# Patient Record
Sex: FEMALE | Race: BLACK OR AFRICAN AMERICAN | URBAN - METROPOLITAN AREA
[De-identification: names, ages, dates, MRNs, and addresses within clinical notes are randomized per-mention and may not be internally consistent; named-entity substitution may affect disease eponyms.]

---

## 2017-07-16 DIAGNOSIS — I10 ESSENTIAL HYPERTENSION: ICD-10-CM

## 2017-07-19 RX ORDER — LOSARTAN POTASSIUM AND HYDROCHLOROTHIAZIDE 12.5; 5 MG/1; MG/1
1 TABLET ORAL DAILY
Qty: 30 TABLET | Refills: 0 | OUTPATIENT
Start: 2017-07-19

## 2017-07-19 NOTE — TELEPHONE ENCOUNTER
Requesting Losartan-HCTZ refill    Contacted pt as noted last OV 2/23/17; appt scheduled for 8/21/17. Pt states doctor already phoned in this prescription and she had enough med to last until scheduled appt.      Hypertensive Medications  Protocol Criteria:

## 2019-06-13 DIAGNOSIS — I10 ESSENTIAL HYPERTENSION: ICD-10-CM

## 2019-06-14 RX ORDER — LOSARTAN POTASSIUM AND HYDROCHLOROTHIAZIDE 25; 100 MG/1; MG/1
1 TABLET ORAL DAILY
Qty: 90 TABLET | Refills: 1 | Status: SHIPPED | OUTPATIENT
Start: 2019-06-14 | End: 2019-06-20 | Stop reason: RX

## 2019-06-14 NOTE — TELEPHONE ENCOUNTER
Please review; protocol failed.     GQD-[birth year]  LR-1/5/16    Requested Prescriptions   Pending Prescriptions Disp Refills   • LOSARTAN POTASSIUM-HCTZ 100-25 MG Oral Tab [Pharmacy Med Name: LOSARTAN/HCTZ 100/25MG TABLETS] 90 tablet 0     Sig: TAKE 1 TABLET

## 2019-06-19 ENCOUNTER — TELEPHONE (OUTPATIENT)
Dept: OTHER | Age: 59
End: 2019-06-19

## 2019-06-19 RX ORDER — HYDROCHLOROTHIAZIDE 25 MG/1
25 TABLET ORAL DAILY
Qty: 90 TABLET | Refills: 1 | Status: SHIPPED | OUTPATIENT
Start: 2019-06-19 | End: 2020-02-20

## 2019-06-19 RX ORDER — LOSARTAN POTASSIUM 100 MG/1
100 TABLET ORAL DAILY
Qty: 90 TABLET | Refills: 0 | Status: SHIPPED | OUTPATIENT
Start: 2019-06-19 | End: 2019-08-20

## 2019-06-19 NOTE — TELEPHONE ENCOUNTER
Per Adele Grover at [facility] 100-25 is on back order and would like to know if the medication can be split? Per pharmacy pt picked up the medication separate before.

## 2019-06-20 NOTE — TELEPHONE ENCOUNTER
Spoke with patient ,advised that Dr Frederick Dimas sent 2 new prescriptions in replaced of losartan hydrochlorothiazide due to back order, verbalized understanding.         Note      Prescription refilled; call patient.               Note      It is okay to split

## 2019-07-08 ENCOUNTER — TELEPHONE (OUTPATIENT)
Dept: FAMILY MEDICINE CLINIC | Facility: CLINIC | Age: 59
End: 2019-07-08

## 2019-07-08 NOTE — TELEPHONE ENCOUNTER
Called [facility], [place], on hold then phone call was answered and put on hold again, then it rang and was picked up and put on hold again. Advised pharmacist of scripts sent to [facility]; pharmacist saw them.     [address]

## 2019-08-20 RX ORDER — LOSARTAN POTASSIUM 100 MG/1
TABLET ORAL
Qty: 90 TABLET | Refills: 0 | Status: SHIPPED | OUTPATIENT
Start: 2019-08-20

## 2019-08-20 NOTE — TELEPHONE ENCOUNTER
Hypertensive Medications  Protocol Criteria:  · Appointment scheduled in the past 6 months or in the next 3 months  · BMP or CMP in the past 12 months  · Creatinine result < 2  Recent Outpatient Visits            3 years ago Pre-operative laboratory examin

## 2019-08-20 NOTE — TELEPHONE ENCOUNTER
CSS please contact pt to schedule OV. LOV 2/23/16    Please review; protocol failed.   No recent labs, no recent OV

## 2019-11-21 DIAGNOSIS — Z78.9 ENGAGES IN TRAVEL ABROAD: Primary | ICD-10-CM

## 2019-11-21 RX ORDER — CHOLERA VACCINE, LIVE, ORAL 0.4B TO 2B
100 KIT ORAL ONCE
Qty: 100 ML | Refills: 0 | Status: SHIPPED | OUTPATIENT
Start: 2019-11-21 | End: 2019-11-21

## 2019-11-21 RX ORDER — MEFLOQUINE HYDROCHLORIDE 250 MG/1
250 TABLET ORAL
Qty: 8 TABLET | Refills: 0 | Status: SHIPPED | OUTPATIENT
Start: 2019-11-21

## 2019-11-21 RX ORDER — MEFLOQUINE HYDROCHLORIDE 250 MG/1
250 TABLET ORAL
Qty: 8 TABLET | Refills: 0 | Status: SHIPPED | OUTPATIENT
Start: 2019-11-21 | End: 2019-11-21

## 2019-12-01 DIAGNOSIS — K21.9 GASTROESOPHAGEAL REFLUX DISEASE, ESOPHAGITIS PRESENCE NOT SPECIFIED: Primary | ICD-10-CM

## 2019-12-01 RX ORDER — DOXYCYCLINE HYCLATE 100 MG/1
100 CAPSULE ORAL 2 TIMES DAILY
Qty: 20 CAPSULE | Refills: 0 | Status: SHIPPED | OUTPATIENT
Start: 2019-12-01 | End: 2020-04-03

## 2019-12-01 RX ORDER — NICOTINE POLACRILEX 4 MG/1
20 GUM, CHEWING ORAL DAILY
Qty: 30 TABLET | Refills: 1 | Status: SHIPPED | OUTPATIENT
Start: 2019-12-01 | End: 2019-12-31

## 2020-02-20 NOTE — TELEPHONE ENCOUNTER
To reception staff, pls call pt for appt. Review pended refill request as it does not fall under a protocol.   Requested Prescriptions     Pending Prescriptions Disp Refills   • HYDROCHLOROTHIAZIDE 25 MG Oral Tab [Pharmacy Med Name: Diana Griffin

## 2020-02-21 RX ORDER — HYDROCHLOROTHIAZIDE 25 MG/1
TABLET ORAL
Qty: 90 TABLET | Refills: 0 | Status: SHIPPED | OUTPATIENT
Start: 2020-02-21

## 2020-04-03 DIAGNOSIS — K21.9 GASTROESOPHAGEAL REFLUX DISEASE, ESOPHAGITIS PRESENCE NOT SPECIFIED: Primary | ICD-10-CM

## 2020-04-03 RX ORDER — OMEPRAZOLE 20 MG/1
20 CAPSULE, DELAYED RELEASE ORAL
Qty: 90 CAPSULE | Refills: 1 | Status: SHIPPED | OUTPATIENT
Start: 2020-04-03

## 2020-04-03 RX ORDER — DOXYCYCLINE HYCLATE 100 MG/1
100 CAPSULE ORAL 2 TIMES DAILY
Qty: 20 CAPSULE | Refills: 0 | Status: SHIPPED | OUTPATIENT
Start: 2020-04-03

## 2021-04-30 ENCOUNTER — TELEPHONE (OUTPATIENT)
Dept: INTERNAL MEDICINE CLINIC | Facility: CLINIC | Age: 61
End: 2021-04-30

## 2021-04-30 NOTE — TELEPHONE ENCOUNTER
Requesting a prior authorization for Omeprazole 20mg capsules #90      Plan doesn't cover this medication, please call plan at [phone], to initiate prior auth.  Or call to change medication    Id# [id number]

## 2021-05-03 NOTE — TELEPHONE ENCOUNTER
CSS, please assist patient with scheduling an appointment for patient with Dr. Juan Marie. Patient was last seen on 02/23/2016. Medication is over the counter.

## 2022-03-24 ENCOUNTER — MED REC SCAN ONLY (OUTPATIENT)
Dept: FAMILY MEDICINE CLINIC | Facility: CLINIC | Age: 62
End: 2022-03-24